# Patient Record
Sex: MALE | Race: OTHER | Employment: FULL TIME | URBAN - METROPOLITAN AREA
[De-identification: names, ages, dates, MRNs, and addresses within clinical notes are randomized per-mention and may not be internally consistent; named-entity substitution may affect disease eponyms.]

---

## 2019-10-02 ENCOUNTER — OFFICE VISIT (OUTPATIENT)
Dept: FAMILY MEDICINE CLINIC | Age: 29
End: 2019-10-02

## 2019-10-02 VITALS
HEART RATE: 66 BPM | SYSTOLIC BLOOD PRESSURE: 128 MMHG | TEMPERATURE: 98.1 F | OXYGEN SATURATION: 97 % | BODY MASS INDEX: 32.43 KG/M2 | HEIGHT: 68 IN | WEIGHT: 214 LBS | RESPIRATION RATE: 19 BRPM | DIASTOLIC BLOOD PRESSURE: 70 MMHG

## 2019-10-02 DIAGNOSIS — L02.92 FURUNCLES: ICD-10-CM

## 2019-10-02 DIAGNOSIS — K76.0 FATTY LIVER: ICD-10-CM

## 2019-10-02 DIAGNOSIS — R10.13 ABDOMINAL PAIN, EPIGASTRIC: Primary | ICD-10-CM

## 2019-10-02 RX ORDER — SULFAMETHOXAZOLE AND TRIMETHOPRIM 800; 160 MG/1; MG/1
1 TABLET ORAL 2 TIMES DAILY
Qty: 20 TAB | Refills: 0 | Status: SHIPPED | OUTPATIENT
Start: 2019-10-02 | End: 2019-10-12

## 2019-10-02 NOTE — PROGRESS NOTES
[address] [place] [address]    Subjective:   Mesha Colorado is a 30 y.o. male with history of see problem list  CC: establish care, hair issue abdominal pain  History provided by abdiaziz    HPI:  Here to establish care. Today, he complains of a flesh-like, erythematous eruption on his posterior scalp. States this was of an abrupt onset and has been present for over 1 year. Has seen dermatology in past and placed on daily doxy therapy. States lesion regressions when on doxy and immediately reoccurs once abx is discontinued. Has also tried topical clindamycin w/o much success. No associated hair loss. Also complains of intermittent RUQ and Midepigastric. This has been an intermittent and chronic issue. He was seen at [address] [place] [address] ED recently and presents with records which show normal lab work-up to include Lipase and CT scan of his abdomen and pelvis which showed FLD. Pain is aching to burning and aggravated with greasy food and etoh. He denies any melena. Denies any weight loss of night time pain. PMH: None. Folliculitis  Allergies: NKDA  Medications: None. Previously on Doxy  Family Hx: None  Surgical Hx: None  Social: Drinks socially. No tobacco use. Quit 9 months ago. Previously 1/4 ppd  Immunizations: declines all immunizations    History reviewed. No pertinent past medical history. No Known Allergies  No current outpatient medications on file prior to visit. No current facility-administered medications on file prior to visit. No family history on file. Social History     Socioeconomic History    Marital status:      Spouse name: Not on file    Number of children: Not on file    Years of education: Not on file    Highest education level: Not on file   Tobacco Use    Smoking status: Current Every Day Smoker     Packs/day: 0.10     Types: Cigarettes   Substance and Sexual Activity    Alcohol use: Yes     Comment: Only on special occasions    Drug use: No     History reviewed.  No pertinent surgical history. Patient Active Problem List   Diagnosis Code    Skin rash R21     Review of Systems   Constitutional: Negative for chills, fever and weight loss. Cardiovascular: Negative for chest pain. Gastrointestinal: Positive for abdominal pain. Negative for blood in stool, constipation, diarrhea, heartburn, melena, nausea and vomiting. Genitourinary: Negative for dysuria, frequency and urgency. Musculoskeletal: Negative for back pain. Objective:     Visit Vitals  /70   Pulse 66   Temp 98.1 °F (36.7 °C)   Resp 19   Ht 5' 7.75\" (1.721 m)   Wt 214 lb (97.1 kg)   SpO2 97%   BMI 32.78 kg/m²        Physical Exam   Constitutional: He is oriented to person, place, and time. He appears well-developed and well-nourished. Neck: Normal range of motion. Neck supple. No thyromegaly present. Cardiovascular: Normal rate, regular rhythm and normal heart sounds. Pulmonary/Chest: Effort normal and breath sounds normal. No respiratory distress. He has no wheezes. He has no rales. He exhibits no tenderness. Abdominal: Soft. Bowel sounds are normal. He exhibits no distension and no mass. There is no tenderness. There is no rebound and no guarding. Musculoskeletal: Normal range of motion. He exhibits no edema. Neurological: He is alert and oriented to person, place, and time. Skin: Skin is warm and dry. Fleshy, raised, erythematous, patchy lesions noted on posterior scalp with active hair growth. See images. Psychiatric: He has a normal mood and affect. His behavior is normal.           Pertinent Labs/Studies:      Assessment and orders:     Diagnoses and all orders for this visit:    1) Abdominal pain, epigastric       -     Presentation suspicious for GERD. Reassuring CT scan during [facility] ED visit       -     No Red flag symptoms.  Will initiate trial of PPI x6 weeks    2) Fatty liver        -     Obtain Lipid panel and TSH for further eval  -     LIPID PANEL  -     TSH REFLEX TO T4    3) Furuncles        -     Discontinue Clindamycin and initiate trial of Bactrim x10 days. -     Refer to derm for further treatment. -     trimethoprim-sulfamethoxazole (BACTRIM DS, SEPTRA DS) 160-800 mg per tablet; Take 1 Tab by mouth two (2) times a day for 10 days. , Print, Disp-20 Tab, R-0  -     REFERRAL TO DERMATOLOGY        I have reviewed patient medical and social history and medications. I have reviewed pertinent labs results and other data. I have discussed the diagnosis with the patient and the intended plan as seen in the above orders. The patient has received an after-visit summary and questions were answered concerning future plans. I have discussed medication side effects and warnings with the patient as well.     Sarah Dang MD  Resident [address] [facility] [address]  10/02/19    Patient discussed and seen with Dr. Kike John, Attending Physician

## 2019-10-03 ENCOUNTER — LAB ONLY (OUTPATIENT)
Dept: FAMILY MEDICINE CLINIC | Age: 29
End: 2019-10-03

## 2019-10-04 LAB
CHOLEST SERPL-MCNC: 166 MG/DL (ref 100–199)
HDLC SERPL-MCNC: 30 MG/DL
INTERPRETATION, 910389: NORMAL
LDLC SERPL CALC-MCNC: 94 MG/DL (ref 0–99)
TRIGL SERPL-MCNC: 212 MG/DL (ref 0–149)
TSH SERPL DL<=0.005 MIU/L-ACNC: 1.57 UIU/ML (ref 0.45–4.5)
VLDLC SERPL CALC-MCNC: 42 MG/DL (ref 5–40)

## 2020-01-31 ENCOUNTER — OFFICE VISIT (OUTPATIENT)
Dept: FAMILY MEDICINE CLINIC | Age: 30
End: 2020-01-31

## 2020-01-31 VITALS
RESPIRATION RATE: 16 BRPM | HEART RATE: 60 BPM | DIASTOLIC BLOOD PRESSURE: 70 MMHG | WEIGHT: 201 LBS | OXYGEN SATURATION: 99 % | BODY MASS INDEX: 30.46 KG/M2 | SYSTOLIC BLOOD PRESSURE: 115 MMHG | HEIGHT: 68 IN | TEMPERATURE: 96.8 F

## 2020-01-31 DIAGNOSIS — R10.11 RIGHT UPPER QUADRANT PAIN: ICD-10-CM

## 2020-01-31 DIAGNOSIS — R10.13 EPIGASTRIC PAIN: Primary | ICD-10-CM

## 2020-01-31 RX ORDER — PHENOL/SODIUM PHENOLATE
20 AEROSOL, SPRAY (ML) MUCOUS MEMBRANE 2 TIMES DAILY
Qty: 60 TAB | Refills: 1 | Status: SHIPPED | OUTPATIENT
Start: 2020-01-31 | End: 2020-03-01

## 2020-01-31 RX ORDER — DOXYCYCLINE 100 MG/1
100 CAPSULE ORAL 2 TIMES DAILY
COMMUNITY

## 2020-01-31 NOTE — PROGRESS NOTES
Ilsa Mukherjee is a 30 y.o. male who had concerns including Abdominal Pain (times 3-4 weeks). HPI:    Coming in for follow up of abdominal pain. Was last seen in 10/2019. Was told to take PPI for 6 weeks. He reports he was taking Tums a few times a day without much relief. Feels like it is worse after eating. Epigastric pain rated as 1/10, RUQ pain rated as 3/10. No nausea, vomiting, diarrhea, constipation. Did have a workup at [address] [place] [address] including CMP, lipase, CT scan that essentially showed fatty liver and all labs were normal.    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 Denied fever, chills, chest pain, shortness of breath, dysuria, new rashes. ROS: (positive in bold)  General: wt loss, fever, chills  HEENT: changes in vision, sore throat, rhinorrhea  Cardiac: chest pain, palpitations  Pul: SOB, dyspnea, wheezing, cough  GI: abdominal pain, nausea, vomiting, diarrhea, constipation   : dysuria, freq, urgency  MSK: joint pain, swelling, myalgia, back pain  Neuro: weakness, parasthesias, headache  Psych: anxiety, depression  Skin: rashes or suspicious skin lesions    Past Medical History:  No past medical history on file. Past Surgical History:  No past surgical history on file. Family History:  No family history on file. No family history of liver or stomach problems. Allergies:  No Known Allergies    Social History:  Social History     Tobacco Use    Smoking status: Former Smoker     Packs/day: 0.10     Types: Cigarettes     Last attempt to quit: 2019     Years since quittin.2    Smokeless tobacco: Never Used   Substance Use Topics    Alcohol use: Yes     Comment: Only on special occasions    Drug use: No       Current Meds:  Current Outpatient Medications on File Prior to Visit   Medication Sig Dispense Refill    doxycycline (MONODOX) 100 mg capsule Take 100 mg by mouth two (2) times a day.        No current facility-administered medications on file prior to visit. Physical Exam:  Visit Vitals  /70   Pulse 60   Temp 96.8 °F (36 °C) (Oral)   Resp 16   Ht 5' 7.75\" (1.721 m)   Wt 201 lb (91.2 kg)   SpO2 99%   BMI 30.79 kg/m²       Gen:  Well developed, well nourished male in no acute distress  HEENT: normocephalic/atraumatic  Neck:   Supple  Card:  RRR, no murmurs  Chest:  CTAB posteriorly  Abd:  BS+, soft, epigastric and RUQ pain with palpation  Extr:  2+ pulses BL  MSK:  no joint pain or swelling  Neuro: CN II-XII grossly intact  Psych:  normal mood and affect   Skin:  No rashes or suspicious skin lesions noted    Assessment/Plan:    1. Epigastric pain: per last visit, thought to be related to GERD. Was instructed to take PPI for 6 weeks, but he only took Tums. Never took a PPI. Next steps was to consider EGD for further workup. Had labs and CT abdomen done that was essentially normal at [address] [place] [address] a few months ago. - Omeprazole delayed release (PRILOSEC D/R) 20 mg tablet; Take 1 Tab by mouth two (2) times a day for 30 days. Dispense: 60 Tab; Refill: 1- will trial PPI 20mg BID as he never took it in the past, only took Tums  - REFERRAL TO GASTROENTEROLOGY- for possible EGD/further workup    2. Right upper quadrant pain: does have NAFL seen on CT done at [address] [place] [address]. Lipid panel done in 10/2019 showed slightly elevated lipids. He has lost about 30 pounds with diet and exercise. Still having fullness at RUQ- worse with palpation. Will further evaluate the gallbladder and liver with RUQ US. He will come back for fasting labs. - US ABD LTD; Future- to further evaluate gallbladder and liver. Advised to call central scheduling in about one week if he does not get an appointment.  - REFERRAL TO GASTROENTEROLOGY- for NAFL.  - METABOLIC PANEL, COMPREHENSIVE; Future- for AST, ALT  - LIPID PANEL; Future- for NAFL, was elevated at last visit. Will come back for fasting labs.   -if lipid levels still elevated, will start on statin for NAFL/hyperlipidemia      I have discussed the diagnosis with the patient and the intended plan as seen in the above orders. The patient has received an after-visit summary and questions were answered concerning future plans. I have discussed medication side effects and warnings with the patient as well. The patient agrees and understands above plan. Follow-up and Dispositions    · Return if symptoms worsen or fail to improve. Patient discussed with supervising attending.     Latasha Silva DO  Primary Care/Sports Medicine Fellow

## 2020-01-31 NOTE — PROGRESS NOTES
Chief Complaint   Patient presents with    Abdominal Pain     times 3-4 weeks     1. Have you been to the ER, urgent care clinic since your last visit? Hospitalized since your last visit? No    2. Have you seen or consulted any other health care providers outside of the [address] [place] [address] since your last visit? Include any pap smears or colon screening.  No

## 2020-01-31 NOTE — PATIENT INSTRUCTIONS
Abdominal Pain: Care Instructions Your Care Instructions Abdominal pain has many possible causes. Some aren't serious and get better on their own in a few days. Others need more testing and treatment. If your pain continues or gets worse, you need to be rechecked and may need more tests to find out what is wrong. You may need surgery to correct the problem. Don't ignore new symptoms, such as fever, nausea and vomiting, urination problems, pain that gets worse, and dizziness. These may be signs of a more serious problem. Your doctor may have recommended a follow-up visit in the next 8 to 12 hours. If you are not getting better, you may need more tests or treatment. The doctor has checked you carefully, but problems can develop later. If you notice any problems or new symptoms, get medical treatment right away. Follow-up care is a key part of your treatment and safety. Be sure to make and go to all appointments, and call your doctor if you are having problems. It's also a good idea to know your test results and keep a list of the medicines you take. How can you care for yourself at home? · Rest until you feel better. · To prevent dehydration, drink plenty of fluids, enough so that your urine is light yellow or clear like water. Choose water and other caffeine-free clear liquids until you feel better. If you have kidney, heart, or liver disease and have to limit fluids, talk with your doctor before you increase the amount of fluids you drink. · If your stomach is upset, eat mild foods, such as rice, dry toast or crackers, bananas, and applesauce. Try eating several small meals instead of two or three large ones. · Wait until 48 hours after all symptoms have gone away before you have spicy foods, alcohol, and drinks that contain caffeine. · Do not eat foods that are high in fat.  
· Avoid anti-inflammatory medicines such as aspirin, ibuprofen (Advil, Motrin), and naproxen (Aleve). These can cause stomach upset. Talk to your doctor if you take daily aspirin for another health problem. When should you call for help? Call 911 anytime you think you may need emergency care. For example, call if: 
  · You passed out (lost consciousness).  
  · You pass maroon or very bloody stools.  
  · You vomit blood or what looks like coffee grounds.  
  · You have new, severe belly pain.  
 Call your doctor now or seek immediate medical care if: 
  · Your pain gets worse, especially if it becomes focused in one area of your belly.  
  · You have a new or higher fever.  
  · Your stools are black and look like tar, or they have streaks of blood.  
  · You have unexpected vaginal bleeding.  
  · You have symptoms of a urinary tract infection. These may include: 
? Pain when you urinate. ? Urinating more often than usual. 
? Blood in your urine.  
  · You are dizzy or lightheaded, or you feel like you may faint.  
 Watch closely for changes in your health, and be sure to contact your doctor if: 
  · You are not getting better after 1 day (24 hours). Where can you learn more? Go to http://frances-venessa.info/. Enter I572 in the search box to learn more about \"Abdominal Pain: Care Instructions. \" Current as of: June 26, 2019 Content Version: 12.2 © 8238-7532 [employer]. Care instructions adapted under license by [employer] (which disclaims liability or warranty for this information). If you have questions about a medical condition or this instruction, always ask your healthcare professional. [place] [address] any warranty or liability for your use of this information. Indigestion (Dyspepsia or Heartburn): Care Instructions Your Care Instructions Sometimes it can be hard to pinpoint the cause of indigestion.  (It is also called dyspepsia or heartburn.) Most cases of an upset stomach with bloating, burning, burping, and nausea are minor and go away within several hours. Home treatment and over-the-counter medicine often are able to control symptoms. But if you take medicine to relieve your indigestion without making diet and lifestyle changes, your symptoms are likely to return again and again. If you get indigestion often, it may be a sign of a more serious medical problem. Be sure to follow up with your doctor, who may want to do tests to be sure of the cause of your indigestion. Follow-up care is a key part of your treatment and safety. Be sure to make and go to all appointments, and call your doctor if you are having problems. It's also a good idea to know your test results and keep a list of the medicines you take. How can you care for yourself at home? · Your doctor may recommend over-the-counter medicine. For mild or occasional indigestion, antacids such as Gaviscon, Mylanta, Maalox, or Tums, may help. Be safe with medicines. Be careful when you take over-the-counter antacid medicines. Many of these medicines have aspirin in them. Read the label to make sure that you are not taking more than the recommended dose. Too much aspirin can be harmful. · Your doctor also may recommend over-the-counter acid reducers, such as Pepcid AC, Tagamet HB, Zantac 75, or Prilosec. Read and follow all instructions on the label. If you use these medicines often, talk with your doctor. · Change your eating habits. ? It's best to eat several small meals instead of two or three large meals. ? After you eat, wait 2 to 3 hours before you lie down. ? Chocolate, mint, and alcohol can make GERD worse. ? Spicy foods, foods that have a lot of acid (like tomatoes and oranges), and coffee can make GERD symptoms worse in some people. If your symptoms are worse after you eat a certain food, you may want to stop eating that food to see if your symptoms get better. · Do not smoke or chew tobacco. Smoking can make GERD worse. If you need help quitting, talk to your doctor about stop-smoking programs and medicines. These can increase your chances of quitting for good. · If you have GERD symptoms at night, raise the head of your bed 6 to 8 inches. You can do this by putting the frame on blocks or placing a foam wedge under the head of your mattress. (Adding extra pillows does not work.) · Do not wear tight clothing around your middle. · Lose weight if you need to. Losing just 5 to 10 pounds can help. · Do not take anti-inflammatory medicines, such as aspirin, ibuprofen (Advil, Motrin), or naproxen (Aleve). These can irritate the stomach. If you need a pain medicine, try acetaminophen (Tylenol), which does not cause stomach upset. When should you call for help? Call your doctor now or seek immediate medical care if: 
  · You have new or worse belly pain.  
  · You are vomiting.  
 Watch closely for changes in your health, and be sure to contact your doctor if: 
  · You have new or worse symptoms of indigestion.  
  · You have trouble or pain swallowing.  
  · You are losing weight.  
  · You do not get better as expected. Where can you learn more? Go to http://frances-venessa.info/. Enter B277 in the search box to learn more about \"Indigestion (Dyspepsia or Heartburn): Care Instructions. \" Current as of: November 7, 2018 Content Version: 12.2 © 2929-1771 [employer]. Care instructions adapted under license by [employer] (which disclaims liability or warranty for this information). If you have questions about a medical condition or this instruction, always ask your healthcare professional. [address] any warranty or liability for your use of this information.

## 2020-02-10 ENCOUNTER — HOSPITAL ENCOUNTER (OUTPATIENT)
Dept: ULTRASOUND IMAGING | Age: 30
Discharge: HOME | End: 2020-02-10
Attending: STUDENT IN AN ORGANIZED HEALTH CARE EDUCATION/TRAINING PROGRAM
Payer: SELF-PAY

## 2020-02-10 DIAGNOSIS — R10.11 RIGHT UPPER QUADRANT PAIN: ICD-10-CM

## 2020-02-10 PROCEDURE — 76705 ECHO EXAM OF ABDOMEN: CPT

## 2020-02-19 ENCOUNTER — TELEPHONE (OUTPATIENT)
Dept: FAMILY MEDICINE CLINIC | Age: 30
End: 2020-02-19

## 2020-02-19 NOTE — TELEPHONE ENCOUNTER
[phone]    Patient called to say he saw Flavia Sellers last week, have your received the report, and please call him.

## 2023-05-20 RX ORDER — DOXYCYCLINE 100 MG/1
100 CAPSULE ORAL 2 TIMES DAILY
COMMUNITY